# Patient Record
Sex: FEMALE | Race: BLACK OR AFRICAN AMERICAN | NOT HISPANIC OR LATINO | Employment: FULL TIME | URBAN - METROPOLITAN AREA
[De-identification: names, ages, dates, MRNs, and addresses within clinical notes are randomized per-mention and may not be internally consistent; named-entity substitution may affect disease eponyms.]

---

## 2017-12-08 DIAGNOSIS — Z12.11 COLON CANCER SCREENING: ICD-10-CM

## 2018-04-11 ENCOUNTER — OFFICE VISIT (OUTPATIENT)
Dept: FAMILY MEDICINE | Facility: CLINIC | Age: 53
End: 2018-04-11
Payer: OTHER GOVERNMENT

## 2018-04-11 VITALS
HEART RATE: 64 BPM | HEIGHT: 68 IN | SYSTOLIC BLOOD PRESSURE: 102 MMHG | WEIGHT: 204.69 LBS | DIASTOLIC BLOOD PRESSURE: 70 MMHG | BODY MASS INDEX: 31.02 KG/M2 | TEMPERATURE: 99 F

## 2018-04-11 DIAGNOSIS — M75.81 ROTATOR CUFF TENDONITIS, RIGHT: ICD-10-CM

## 2018-04-11 DIAGNOSIS — Z12.11 COLON CANCER SCREENING: ICD-10-CM

## 2018-04-11 DIAGNOSIS — Z12.39 BREAST CANCER SCREENING: ICD-10-CM

## 2018-04-11 DIAGNOSIS — M77.9 TENDINITIS: ICD-10-CM

## 2018-04-11 DIAGNOSIS — Z00.00 PREVENTATIVE HEALTH CARE: Primary | ICD-10-CM

## 2018-04-11 PROCEDURE — 99999 PR PBB SHADOW E&M-EST. PATIENT-LVL III: CPT | Mod: PBBFAC,,, | Performed by: FAMILY MEDICINE

## 2018-04-11 PROCEDURE — 99213 OFFICE O/P EST LOW 20 MIN: CPT | Mod: PBBFAC,PN | Performed by: FAMILY MEDICINE

## 2018-04-11 PROCEDURE — 99396 PREV VISIT EST AGE 40-64: CPT | Mod: S$PBB,,, | Performed by: FAMILY MEDICINE

## 2018-04-11 RX ORDER — DICLOFENAC SODIUM 10 MG/G
2 GEL TOPICAL 2 TIMES DAILY
Qty: 300 G | Refills: 3 | Status: SHIPPED | OUTPATIENT
Start: 2018-04-11 | End: 2019-03-28

## 2018-04-11 NOTE — PROGRESS NOTES
Subjective:     THIS DOCUMENT WAS MADE IN PART WITH [employer] DICTATION SOFTWARE.  OCCASIONALLY THIS SOFTWARE WILL MISINTERPRET WORDS OR PHRASES.     Patient ID: Annamarie Musa is a 52 y.o. female.    Chief Complaint: Annual Exam    HPI   She returns for her annual examination.  She has generally been doing well but does report hot flashes for one year.  She has had a partial hysterectomy so has not had periods for many years.  Hot flashes are uncomfortable.  She has tried over-the-counter treatments that include ginger root and says this has helped mildly.    She does have chronic joint and soft tissue pains.  She does request a refill on the Voltaren gel which has helped her.    She requests breast cancer screening and is agreeable to colon cancer screening    Active Ambulatory Problems     Diagnosis Date Noted    No Active Ambulatory Problems     Resolved Ambulatory Problems     Diagnosis Date Noted    No Resolved Ambulatory Problems     No Additional Past Medical History     Current Outpatient Prescriptions on File Prior to Visit   Medication Sig Dispense Refill    acetaminophen (TYLENOL) suppository Place 325 mg rectally every 4 (four) hours as needed for Temperature greater than.      ibuprofen (ADVIL,MOTRIN) 200 MG tablet Take 200 mg by mouth every 6 (six) hours as needed.      triamcinolone acetonide 0.1% (KENALOG) 0.1 % cream aaa bid x 2 weeks then prn, avoid chronic use. 80 g 1    [DISCONTINUED] diclofenac sodium (VOLTAREN) 1 % Gel Apply 2 g topically 2 (two) times daily. 300 g 3     No current facility-administered medications on file prior to visit.      Review of patient's allergies indicates:  No Known Allergies  Social History   Substance Use Topics    Smoking status: Never Smoker    Smokeless tobacco: Not on file    Alcohol use No     Family History   Problem Relation Age of Onset    Rheum arthritis Mother     Glaucoma Mother     Hypertension Maternal Grandmother          Review of Systems    Constitutional: Negative for appetite change, fatigue, fever and unexpected weight change.   HENT: Negative for sinus pressure and trouble swallowing.    Eyes: Negative for pain and visual disturbance.   Respiratory: Negative.  Negative for cough, chest tightness and shortness of breath.    Cardiovascular: Negative.  Negative for chest pain, palpitations and leg swelling.   Gastrointestinal: Negative.  Negative for abdominal pain, blood in stool, constipation, diarrhea, nausea and vomiting.   Endocrine: Negative for polydipsia and polyuria.   Genitourinary: Negative.  Negative for dysuria, frequency and hematuria.   Musculoskeletal: Positive for arthralgias (knees). Negative for gait problem, myalgias and neck pain.   Skin: Positive for color change. Negative for rash and wound.   Neurological: Negative.  Negative for dizziness, tremors, syncope, numbness and headaches.   Hematological: Negative.    Psychiatric/Behavioral: Negative.  Negative for dysphoric mood and sleep disturbance. The patient is not nervous/anxious.        Objective:      Physical Exam   Constitutional: She is oriented to person, place, and time. She appears well-developed and well-nourished.  Non-toxic appearance. No distress.   HENT:   Head: Normocephalic and atraumatic.   Right Ear: Tympanic membrane, external ear and ear canal normal.   Left Ear: Tympanic membrane, external ear and ear canal normal.   Nose: Nose normal.   Mouth/Throat: Oropharynx is clear and moist. No oropharyngeal exudate.   Eyes: Conjunctivae and EOM are normal. Pupils are equal, round, and reactive to light. No scleral icterus.   Neck: Normal range of motion. Neck supple. No JVD present. No thyromegaly present.   Cardiovascular: Normal rate, regular rhythm, normal heart sounds and intact distal pulses.  PMI is not displaced.  Exam reveals no gallop and no friction rub.    No murmur heard.  Pulmonary/Chest: Breath sounds normal. No respiratory distress. She has no  wheezes. She has no rales.   Abdominal: Soft. Bowel sounds are normal. She exhibits no distension and no mass. There is no tenderness. There is no rebound and no guarding.   Musculoskeletal: She exhibits no edema.   Lymphadenopathy:     She has no cervical adenopathy.   Neurological: She is alert and oriented to person, place, and time. She displays normal reflexes. No cranial nerve deficit. She exhibits normal muscle tone.   Skin: Skin is dry. No rash noted. No pallor.   Psychiatric: She has a normal mood and affect. Her behavior is normal.   Hypopigmented macules and patches.  One on the left cheek, larger area near the right clavicle and neck.  She has seen dermatology and has been diagnosed with vitiligo.   Vitals reviewed.      Assessment:       1. Preventative health care    2. Tendinitis    3. Rotator cuff tendonitis, right    4. Breast cancer screening    5. Colon cancer screening        Plan:       Annamarie was seen today for annual exam.    Diagnoses and all orders for this visit:    Preventative health care  -     Comprehensive metabolic panel; Future  -     TSH; Future  -     Lipid panel; Future  -     CBC auto differential; Future  -     Mammo Digital Screening Bilateral With CAD; Future  -     Case request GI: COLONOSCOPY    Tendinitis  -     diclofenac sodium (VOLTAREN) 1 % Gel; Apply 2 g topically 2 (two) times daily.    Rotator cuff tendonitis, right  -     diclofenac sodium (VOLTAREN) 1 % Gel; Apply 2 g topically 2 (two) times daily.    Breast cancer screening  -     Mammo Digital Screening Bilateral With CAD; Future    Colon cancer screening  -     Case request GI: COLONOSCOPY    We did discuss her hot flashes.  Discussed the likely cause at 52 years old which is menopause.  Discussed treatment options as well as the risk and benefit.  At this time she does not wish to consider hormone replacement most certainly.  She also does not wish to consider treatment with an SNRI.  She will try  over-the-counter supplements but if things worsen or do not improve she will let me know and discuss other options.

## 2018-05-12 ENCOUNTER — HOSPITAL ENCOUNTER (OUTPATIENT)
Dept: RADIOLOGY | Facility: HOSPITAL | Age: 53
Discharge: HOME | End: 2018-05-12
Attending: FAMILY MEDICINE
Payer: OTHER GOVERNMENT

## 2018-05-12 DIAGNOSIS — Z12.39 BREAST CANCER SCREENING: ICD-10-CM

## 2018-05-12 DIAGNOSIS — Z00.00 PREVENTATIVE HEALTH CARE: ICD-10-CM

## 2018-05-12 PROCEDURE — 77067 SCR MAMMO BI INCL CAD: CPT | Mod: 26,,, | Performed by: RADIOLOGY

## 2018-05-12 PROCEDURE — 77067 SCR MAMMO BI INCL CAD: CPT | Mod: TC,PO

## 2018-05-13 ENCOUNTER — PATIENT MESSAGE (OUTPATIENT)
Dept: FAMILY MEDICINE | Facility: CLINIC | Age: 53
End: 2018-05-13

## 2018-05-13 ENCOUNTER — TELEPHONE (OUTPATIENT)
Dept: FAMILY MEDICINE | Facility: CLINIC | Age: 53
End: 2018-05-13

## 2018-05-13 DIAGNOSIS — E83.52 HYPERCALCEMIA: ICD-10-CM

## 2018-05-13 DIAGNOSIS — R79.89 LOW TSH LEVEL: Primary | ICD-10-CM

## 2018-05-13 NOTE — TELEPHONE ENCOUNTER
Please call with laboratory results. I did send an explanation through my chart.     Labs suggest hyperthyroidism. Please schedule a repeat thyroid labs. Also calcium mildly elevated, repeat calcium and a parathyroid hormone.

## 2018-05-14 NOTE — TELEPHONE ENCOUNTER
Spoke with pt and informed her of results; pt said she will go and get labs done this week.     Pt verbally understands.

## 2018-05-15 ENCOUNTER — LAB VISIT (OUTPATIENT)
Dept: LAB | Facility: HOSPITAL | Age: 53
End: 2018-05-15
Attending: FAMILY MEDICINE
Payer: OTHER GOVERNMENT

## 2018-05-15 ENCOUNTER — TELEPHONE (OUTPATIENT)
Dept: RADIOLOGY | Facility: HOSPITAL | Age: 53
End: 2018-05-15

## 2018-05-15 DIAGNOSIS — R79.89 LOW TSH LEVEL: ICD-10-CM

## 2018-05-15 DIAGNOSIS — E83.52 HYPERCALCEMIA: ICD-10-CM

## 2018-05-15 LAB
CA-I BLDV-SCNC: 1.33 MMOL/L
PTH-INTACT SERPL-MCNC: 26 PG/ML
T3FREE SERPL-MCNC: 4.8 PG/ML
T4 FREE SERPL-MCNC: 1.49 NG/DL
TSH SERPL DL<=0.005 MIU/L-ACNC: <0.01 UIU/ML

## 2018-05-15 PROCEDURE — 82330 ASSAY OF CALCIUM: CPT

## 2018-05-15 PROCEDURE — 83970 ASSAY OF PARATHORMONE: CPT

## 2018-05-15 PROCEDURE — 84443 ASSAY THYROID STIM HORMONE: CPT

## 2018-05-15 PROCEDURE — 84439 ASSAY OF FREE THYROXINE: CPT

## 2018-05-15 PROCEDURE — 83520 IMMUNOASSAY QUANT NOS NONAB: CPT

## 2018-05-15 PROCEDURE — 36415 COLL VENOUS BLD VENIPUNCTURE: CPT | Mod: PO

## 2018-05-15 PROCEDURE — 84481 FREE ASSAY (FT-3): CPT

## 2018-05-16 LAB — TSH RECEP AB SER-ACNC: 3.24 IU/L

## 2018-05-17 ENCOUNTER — TELEPHONE (OUTPATIENT)
Dept: FAMILY MEDICINE | Facility: CLINIC | Age: 53
End: 2018-05-17

## 2018-05-17 DIAGNOSIS — E05.90 HYPERTHYROIDISM: Primary | ICD-10-CM

## 2018-05-17 NOTE — TELEPHONE ENCOUNTER
I spoke with patient and discussed the results of her test.  Findings are most likely consistent with Graves disease.    She is not having obvious symptoms at this time.    Please help her arrange consultation with Endocrinology as soon as possible.  It is not emergent as she is not having significant symptoms but I still would prefer to have this evaluated within 1-2 weeks.  If possible we may have to arrange on the [place].  For

## 2018-05-17 NOTE — TELEPHONE ENCOUNTER
Forwarding note to endocrine nurse for scheduling assistance for possible Graves Disease. Per Dr. Ardon patient needs to be seen ASAP

## 2018-05-21 ENCOUNTER — HOSPITAL ENCOUNTER (OUTPATIENT)
Dept: RADIOLOGY | Facility: HOSPITAL | Age: 53
Discharge: HOME | End: 2018-05-21
Attending: FAMILY MEDICINE
Payer: OTHER GOVERNMENT

## 2018-05-21 DIAGNOSIS — R92.8 ABNORMAL MAMMOGRAM OF LEFT BREAST: ICD-10-CM

## 2018-05-21 PROCEDURE — 77061 BREAST TOMOSYNTHESIS UNI: CPT | Mod: TC,PO,LT

## 2018-05-21 PROCEDURE — 77065 DX MAMMO INCL CAD UNI: CPT | Mod: TC,PO,LT

## 2018-05-21 PROCEDURE — 77061 BREAST TOMOSYNTHESIS UNI: CPT | Mod: 26,LT,, | Performed by: RADIOLOGY

## 2018-05-21 PROCEDURE — 77065 DX MAMMO INCL CAD UNI: CPT | Mod: 26,LT,, | Performed by: RADIOLOGY

## 2018-05-21 PROCEDURE — 76642 ULTRASOUND BREAST LIMITED: CPT | Mod: 26,LT,, | Performed by: RADIOLOGY

## 2018-05-21 PROCEDURE — 76642 ULTRASOUND BREAST LIMITED: CPT | Mod: TC,PO,LT

## 2018-05-23 ENCOUNTER — TELEPHONE (OUTPATIENT)
Dept: FAMILY MEDICINE | Facility: CLINIC | Age: 53
End: 2018-05-23

## 2018-05-23 NOTE — TELEPHONE ENCOUNTER
Spoke with pt's daughter, she stated pt was not home yet but would be arriving shortly.  Advised pt's daughter that I was calling to update her on endocrine appt.  Advised that endocrine is pretty booked up, however we are reaching out to outside endocrinologists to see about getting an appt soon.  Advised daughter that if we do not have any progress with getting an appt we will set her up to see Dr. Ardon.  Daughter verbalized understanding and stated she would pass message along to pt and have pt call us back if she has any questions or concerns.

## 2018-05-24 NOTE — TELEPHONE ENCOUNTER
LM with pt to return our call.  Spoke with [facility] and they stated they could get her in with endo in the next 2 weeks.  Pt needs to call [phone] to set up appt (will need insurance info).

## 2018-05-25 NOTE — TELEPHONE ENCOUNTER
LM with pt to return our call.  Spoke with [facility] and they stated they could get her in with endo in the next 2 weeks.  Pt needs to call [phone] to set up appt (will need insurance info).

## 2018-06-01 ENCOUNTER — TELEPHONE (OUTPATIENT)
Dept: FAMILY MEDICINE | Facility: CLINIC | Age: 53
End: 2018-06-01

## 2018-06-01 NOTE — TELEPHONE ENCOUNTER
----- Message from Vanessa Heard sent at 6/1/2018 10:31 AM CDT -----  Contact: Katerin with [facility]   Katerin with [facility] need to speak with nurse regarding mutual patient. Please call back at [phone].

## 2018-06-01 NOTE — TELEPHONE ENCOUNTER
----- Message from Kaley Sheridan sent at 6/1/2018  2:14 PM CDT -----  Contact: [facility]   Type:  Patient Returning Call    Who Called:  Katerin   Who Left Message for Patient:  Malissa Rossl   Does the patient know what this is regarding?:  [facility] returning the nurse phone call. Best Call Back Number:  [phone].    Additional Information:

## 2018-07-10 ENCOUNTER — LAB VISIT (OUTPATIENT)
Dept: LAB | Facility: HOSPITAL | Age: 53
End: 2018-07-10
Attending: INTERNAL MEDICINE
Payer: OTHER GOVERNMENT

## 2018-07-10 DIAGNOSIS — E05.00 GRAVES DISEASE: ICD-10-CM

## 2018-07-10 DIAGNOSIS — E05.90 HYPERTHYROIDISM: Primary | ICD-10-CM

## 2018-07-10 LAB
T3FREE SERPL-MCNC: 2.7 PG/ML
T4 FREE SERPL-MCNC: 0.71 NG/DL

## 2018-07-10 PROCEDURE — 84439 ASSAY OF FREE THYROXINE: CPT

## 2018-07-10 PROCEDURE — 36415 COLL VENOUS BLD VENIPUNCTURE: CPT | Mod: PO

## 2018-07-10 PROCEDURE — 84481 FREE ASSAY (FT-3): CPT

## 2018-07-10 RX ORDER — METHIMAZOLE 10 MG/1
10 TABLET ORAL DAILY
COMMUNITY
End: 2019-03-28

## 2018-07-10 RX ORDER — SODIUM CHLORIDE 0.9 % (FLUSH) 0.9 %
3 SYRINGE (ML) INJECTION
Status: CANCELLED | OUTPATIENT
Start: 2018-07-10

## 2018-07-11 ENCOUNTER — ANESTHESIA (OUTPATIENT)
Dept: ENDOSCOPY | Facility: HOSPITAL | Age: 53
End: 2018-07-11
Payer: OTHER GOVERNMENT

## 2018-07-11 ENCOUNTER — HOSPITAL ENCOUNTER (OUTPATIENT)
Facility: HOSPITAL | Age: 53
Discharge: HOME | End: 2018-07-11
Attending: INTERNAL MEDICINE | Admitting: INTERNAL MEDICINE
Payer: OTHER GOVERNMENT

## 2018-07-11 ENCOUNTER — SURGERY (OUTPATIENT)
Age: 53
End: 2018-07-11

## 2018-07-11 ENCOUNTER — ANESTHESIA EVENT (OUTPATIENT)
Dept: ENDOSCOPY | Facility: HOSPITAL | Age: 53
End: 2018-07-11
Payer: OTHER GOVERNMENT

## 2018-07-11 VITALS
DIASTOLIC BLOOD PRESSURE: 72 MMHG | SYSTOLIC BLOOD PRESSURE: 111 MMHG | HEART RATE: 60 BPM | WEIGHT: 190 LBS | BODY MASS INDEX: 28.79 KG/M2 | TEMPERATURE: 98 F | RESPIRATION RATE: 18 BRPM | OXYGEN SATURATION: 100 % | HEIGHT: 68 IN

## 2018-07-11 DIAGNOSIS — Z12.11 COLON CANCER SCREENING: ICD-10-CM

## 2018-07-11 PROCEDURE — 45380 COLONOSCOPY AND BIOPSY: CPT | Mod: PO | Performed by: INTERNAL MEDICINE

## 2018-07-11 PROCEDURE — 25000003 PHARM REV CODE 250: Mod: PO | Performed by: INTERNAL MEDICINE

## 2018-07-11 PROCEDURE — 37000008 HC ANESTHESIA 1ST 15 MINUTES: Mod: PO | Performed by: INTERNAL MEDICINE

## 2018-07-11 PROCEDURE — 37000009 HC ANESTHESIA EA ADD 15 MINS: Mod: PO | Performed by: INTERNAL MEDICINE

## 2018-07-11 PROCEDURE — D9220A PRA ANESTHESIA: Mod: 33,ANES,, | Performed by: ANESTHESIOLOGY

## 2018-07-11 PROCEDURE — 00812 ANES LWR INTST SCR COLSC: CPT | Mod: PO | Performed by: INTERNAL MEDICINE

## 2018-07-11 PROCEDURE — 63600175 PHARM REV CODE 636 W HCPCS: Mod: PO | Performed by: NURSE ANESTHETIST, CERTIFIED REGISTERED

## 2018-07-11 PROCEDURE — 88305 TISSUE EXAM BY PATHOLOGIST: CPT | Performed by: PATHOLOGY

## 2018-07-11 PROCEDURE — D9220A PRA ANESTHESIA: Mod: 33,CRNA,, | Performed by: NURSE ANESTHETIST, CERTIFIED REGISTERED

## 2018-07-11 PROCEDURE — 45380 COLONOSCOPY AND BIOPSY: CPT | Mod: PT,,, | Performed by: INTERNAL MEDICINE

## 2018-07-11 PROCEDURE — 25000003 PHARM REV CODE 250: Mod: PO | Performed by: ANESTHESIOLOGY

## 2018-07-11 PROCEDURE — 27201012 HC FORCEPS, HOT/COLD, DISP: Mod: PO | Performed by: INTERNAL MEDICINE

## 2018-07-11 PROCEDURE — 88305 TISSUE EXAM BY PATHOLOGIST: CPT | Mod: 26,,, | Performed by: PATHOLOGY

## 2018-07-11 RX ORDER — PROPOFOL 10 MG/ML
VIAL (ML) INTRAVENOUS
Status: DISCONTINUED | OUTPATIENT
Start: 2018-07-11 | End: 2018-07-11

## 2018-07-11 RX ORDER — PROPOFOL 10 MG/ML
VIAL (ML) INTRAVENOUS CONTINUOUS PRN
Status: DISCONTINUED | OUTPATIENT
Start: 2018-07-11 | End: 2018-07-11

## 2018-07-11 RX ORDER — SODIUM CHLORIDE, SODIUM LACTATE, POTASSIUM CHLORIDE, CALCIUM CHLORIDE 600; 310; 30; 20 MG/100ML; MG/100ML; MG/100ML; MG/100ML
INJECTION, SOLUTION INTRAVENOUS CONTINUOUS
Status: DISCONTINUED | OUTPATIENT
Start: 2018-07-11 | End: 2018-07-11 | Stop reason: HOSPADM

## 2018-07-11 RX ORDER — LIDOCAINE HYDROCHLORIDE 10 MG/ML
1 INJECTION, SOLUTION EPIDURAL; INFILTRATION; INTRACAUDAL; PERINEURAL ONCE
Status: COMPLETED | OUTPATIENT
Start: 2018-07-11 | End: 2018-07-11

## 2018-07-11 RX ORDER — LIDOCAINE HCL/PF 100 MG/5ML
SYRINGE (ML) INTRAVENOUS
Status: DISCONTINUED | OUTPATIENT
Start: 2018-07-11 | End: 2018-07-11

## 2018-07-11 RX ADMIN — SODIUM CHLORIDE, SODIUM LACTATE, POTASSIUM CHLORIDE, AND CALCIUM CHLORIDE: .6; .31; .03; .02 INJECTION, SOLUTION INTRAVENOUS at 10:07

## 2018-07-11 RX ADMIN — PROPOFOL 30 MG: 10 INJECTION, EMULSION INTRAVENOUS at 11:07

## 2018-07-11 RX ADMIN — PROPOFOL 75 MCG/KG/MIN: 10 INJECTION, EMULSION INTRAVENOUS at 11:07

## 2018-07-11 RX ADMIN — PROPOFOL 70 MG: 10 INJECTION, EMULSION INTRAVENOUS at 11:07

## 2018-07-11 RX ADMIN — LIDOCAINE HYDROCHLORIDE: 10 INJECTION, SOLUTION EPIDURAL; INFILTRATION; INTRACAUDAL; PERINEURAL at 10:07

## 2018-07-11 RX ADMIN — LIDOCAINE HYDROCHLORIDE 100 MG: 20 INJECTION, SOLUTION INTRAVENOUS at 11:07

## 2018-07-11 RX ADMIN — PROPOFOL 50 MG: 10 INJECTION, EMULSION INTRAVENOUS at 11:07

## 2018-07-11 NOTE — [employer] PATIENT INSTRUCTIONS
Discharge Summary/Instructions for after Colonoscopy with   Biopsy/Polypectomy  Annamarie Musa    Wednesday, July 11, 2018  Devante Neely MD  RESTRICTIONS ON ACTIVITY:  - Do not drive a car or operate machinery until the day after the procedure.      - The following day: return to full activity including work.  - For  3 days: No heavy lifting, straining or running.  - Diet: You can have solid foods, but no gassy foods (i.e. beans, broccoli,   cabbage, etc).  TREATMENT FOR COMMON SIDE EFFECTS:  - Mild abdominal pain and bloating or excessive gas: rest, eat lightly and   use a heating pad.  SYMPTOMS TO WATCH FOR AND REPORT TO YOUR PHYSICIAN:  1. Severe abdominal pain.  2. Fever within 24 hours after a procedure.  3. A large amount of rectal bleeding. (A small amount of blood from the   rectum is not serious, especially if hemorrhoids are present.  3.  Because air was put into your colon during the procedure, expelling   large amounts of air from your rectum is normal.  4.  You may not have a bowel movement for 1-3 days because of the   colonoscopy prep.  This is normal.  5.  Call immediately if you notice any of the following:   Chills and/or fever over 101   Persistent vomiting   Severe abdominal pain, other than gas cramps   Severe chest pain   Black, tarry stools   Any bleeding - exceeding one tablespoon  Your doctor recommends these additional instructions:  We are waiting for your pathology results.   If the pathology report reveals adenomatous (precancerous) tissue, then your   physician recommends a repeat colonoscopy for surveillance in 5 years.   If the pathology report indicates a hyperplastic polyp, then the colonoscopy   will be repeated for surveillance in 8 years.   Eat a high fiber diet.   Take a fiber supplement, for example Citrucel, Fibercon, Konsyl or   Metamucil.   Take a PROBIOTIC, such as a carton of GREEK YOGURT (Chobani or Oikos,  or   Activia or Dannon);  or tablets of ALIGN or  CULTURELLE or EDWARD-Q (all   non-prescription), every day for a month.   And repeat this 3-4 times a   year.  None  If you have any questions or problems, please call your physician.  EMERGENCY PHONE NUMBER: [phone]  LAB RESULTS: Call in two (2) weeks for lab results, [phone]  ___________________________________________  Nurse Signature  ___________________________________________  Patient/Designated Responsible Party Signature  Devante Neely MD  7/11/2018 11:59:52 AM  This report has been verified and signed electronically.  [employer]

## 2018-07-11 NOTE — H&P
History & Physical - Short Stay  Gastroenterology      SUBJECTIVE:     Procedure: Colonoscopy    Chief Complaint/Indication for Procedure: Screening    History of Present Illness:  Asymptomatic  Office Visit     4/11/2018  [facility]      Deny Ardon MD   Family Medicine   Preventative health care +4 more   Dx   Annual Exam   Reason for Visit    Progress Notes        Subjective:     THIS DOCUMENT WAS MADE IN PART WITH [employer] DICTATION SOFTWARE.  OCCASIONALLY THIS SOFTWARE WILL MISINTERPRET WORDS OR PHRASES.      Patient ID: Annamarie Musa is a 52 y.o. female.     Chief Complaint: Annual Exam     HPI   She returns for her annual examination.  She has generally been doing well but does report hot flashes for one year.  She has had a partial hysterectomy so has not had periods for many years.  Hot flashes are uncomfortable.  She has tried over-the-counter treatments that include adan root and says this has helped mildly.     She does have chronic joint and soft tissue pains.  She does request a refill on the Voltaren gel which has helped her.     She requests breast cancer screening and is agreeable to colon cancer screening     Assessment:       1. Preventative health care    2. Tendinitis    3. Rotator cuff tendonitis, right    4. Breast cancer screening    5. Colon cancer screening        Plan:       Annamarie was seen today for annual exam.     Diagnoses and all orders for this visit:     Preventative health care  -     Comprehensive metabolic panel; Future  -     TSH; Future  -     Lipid panel; Future  -     CBC auto differential; Future  -     Mammo Digital Screening Bilateral With CAD; Future  -     Case request GI: COLONOSCOPY     Tendinitis  -     diclofenac sodium (VOLTAREN) 1 % Gel; Apply 2 g topically 2 (two) times daily.     Rotator cuff tendonitis, right  -     diclofenac sodium (VOLTAREN) 1 % Gel; Apply 2 g topically 2 (two) times daily.     Breast cancer  "screening  -     Mammo Digital Screening Bilateral With CAD; Future     Colon cancer screening  -     Case request GI: COLONOSCOPY     We did discuss her hot flashes.  Discussed the likely cause at 52 years old which is menopause.  Discussed treatment options as well as the risk and benefit.  At this time she does not wish to consider hormone replacement most certainly.  She also does not wish to consider treatment with an SNRI.  She will try over-the-counter supplements but if things worsen or do not improve she will let me know and discuss other options.                 PTA Medications   Medication Sig    ibuprofen (ADVIL,MOTRIN) 200 MG tablet Take 200 mg by mouth every 6 (six) hours as needed.    methIMAzole (TAPAZOLE) 10 MG Tab Take 10 mg by mouth once daily.    diclofenac sodium (VOLTAREN) 1 % Gel Apply 2 g topically 2 (two) times daily.       Review of patient's allergies indicates:  No Known Allergies     Past Medical History:   Diagnosis Date    Thyroid disease     hyperthyroidism     Past Surgical History:   Procedure Laterality Date    HYSTERECTOMY      ovaries conserved    TUBAL LIGATION      WISDOM TOOTH EXTRACTION       Family History   Problem Relation Age of Onset    Rheum arthritis Mother     Glaucoma Mother     Hypertension Maternal Grandmother      Social History   Substance Use Topics    Smoking status: Never Smoker    Smokeless tobacco: Never Used    Alcohol use No         OBJECTIVE:     Vital Signs (Most Recent)  Temp: 97.5 °F (36.4 °C) (07/11/18 1040)  Pulse: (!) 56 (07/11/18 1040)  Resp: 18 (07/11/18 1040)  BP: 108/70 (07/11/18 1040)  SpO2: 99 % (07/11/18 1040)    Physical Exam:          : Ht 5' 8" (1.727 m)   Wt 92.8 kg (204 lb 11.2 oz)   BMI 31.12 kg/m²                                           GENERAL:  Comfortable, in no acute distress.                                 HEENT EXAM:  Nonicteric.  No adenopathy.  Oropharynx is clear.               NECK:  Supple.                    "                                            LUNGS:  Clear.                                                               CARDIAC:  Regular rate and rhythm.  S1, S2.  No murmur.                      ABDOMEN:  Soft, positive bowel sounds, nontender.  No hepatosplenomegaly or masses.  No rebound or guarding.                                             EXTREMITIES:  No edema.     MENTAL STATUS:  Alert and oriented.    ASSESSMENT/PLAN:     Assessment: Colorectal cancer screening    Plan: Colonoscopy    Anesthesia Plan:   MAC / General Anaesthesia    ASA Grade: ASA 2 - Patient with mild systemic disease with no functional limitations    MALLAMPATI SCORE: II (hard and soft palate, upper portion of tonsils anduvula visible)

## 2018-07-11 NOTE — ANESTHESIA PREPROCEDURE EVALUATION
07/11/2018  Annamarie Musa is a 53 y.o., female.    Anesthesia Evaluation    I have reviewed the Patient Summary Reports.    I have reviewed the Nursing Notes.   I have reviewed the Medications.     Review of Systems  Anesthesia Hx:  No problems with previous Anesthesia    Social:  Non-Smoker    Hematology/Oncology:  Hematology Normal   Oncology Normal     EENT/Dental:EENT/Dental Normal   Cardiovascular:  Cardiovascular Normal     Pulmonary:  Pulmonary Normal    Renal/:  Renal/ Normal     Hepatic/GI:   Bowel Prep.    Musculoskeletal:  Musculoskeletal Normal    Neurological:  Neurology Normal    Endocrine:   Hyperthyroidism    Dermatological:  Skin Normal    Psych:  Psychiatric Normal           Physical Exam  General:  Well nourished    Airway/Jaw/Neck:  Airway Findings: Mouth Opening: Normal Tongue: Normal  General Airway Assessment: Adult  Mallampati: I  TM Distance: Normal, at least 6 cm        Eyes/Ears/Nose:  EYES/EARS/NOSE FINDINGS: Normal   Dental:  DENTAL FINDINGS: Normal   Chest/Lungs:  Chest/Lungs Findings: Clear to auscultation, Normal Respiratory Rate     Heart/Vascular:  Heart Findings: Rate: Normal  Rhythm: Regular Rhythm        Mental Status:  Mental Status Findings:  Cooperative, Alert and Oriented         Anesthesia Plan  Type of Anesthesia, risks & benefits discussed:  Anesthesia Type:  general  Patient's Preference:   Intra-op Monitoring Plan: standard ASA monitors  Intra-op Monitoring Plan Comments:   Post Op Pain Control Plan: IV/PO Opioids PRN  Post Op Pain Control Plan Comments:   Induction:    Beta Blocker:  Patient is not currently on a Beta-Blocker (No further documentation required).       Informed Consent: Patient understands risks and agrees with Anesthesia plan.  Questions answered. Anesthesia consent signed with patient.  ASA Score: 2     Day of Surgery Review of History &  Physical: I have interviewed and examined the patient. I have reviewed the patient's H&P dated:  There are no significant changes.  H&P update referred to the provider.         Ready For Surgery From Anesthesia Perspective.

## 2018-07-11 NOTE — DISCHARGE INSTRUCTIONS
Recovery After Procedural Sedation (Adult)  You have been given medicine by vein to make you sleep during your surgery. This may have included both a pain medicine and sleeping medicine. Most of the effects have worn off. But you may still have some drowsiness for the next 6 to 8 hours.  Home care  Follow these guidelines when you get home:  · For the next 8 hours, you should be watched by a responsible adult. This person should make sure your condition is not getting worse.  · Don't drink any alcohol for the next 24 hours.  · Don't drive, operate dangerous machinery, or make important business or personal decisions during the next 24 hours.  Note: Your healthcare provider may tell you not to take any medicine by mouth for pain or sleep in the next 4 hours. These medicines may react with the medicines you were given in the hospital. This could cause a much stronger response than usual.  Follow-up care  Follow up with your healthcare provider if you are not alert and back to your usual level of activity within 12 hours.  When to seek medical advice  Call your healthcare provider right away if any of these occur:  · Drowsiness gets worse  · Weakness or dizziness gets worse  · Repeated vomiting  · You can't be awakened   Date Last Reviewed: 10/18/2016  © 4597-9724 [employer]. [address] [place] [address], [place] [address]. All rights reserved. This information is not intended as a substitute for professional medical care. Always follow your healthcare professional's instructions.      PROBIOTICS:  Now that your colon is so cleaned out, now is a good time for a round of PROBIOTICS.  Eat a container of Greek Yogurt, such as OIKOS or CHOBANI,  Or Activia or Dannon    Greek Yogurt.    Or Take a similar Probiotic product such as Align or Culturelle or Kari-Q, every day for a month.                  (The products listed are non-prescription, but you may need to ask the pharmacist for their location.)  Repeat  this 3-4 times a year.        High-Fiber Diet  Fiber is in fruits, vegetables, cereals, and grains. Fiber passes through your body undigested. A high-fiber diet helps food move through your intestinal tract. The added bulk is helpful in preventing constipation. In people with diverticulosis, fiber helps clean out the pouches along the colon wall. It also prevents new pouches from forming. A high-fiber diet reduces the risk of colon cancer. It also lowers blood cholesterol and prevents high blood sugar in people with diabetes.    The fiber-rich foods listed below should be part of your diet. If you are not used to high-fiber foods, start with 1 or 2 foods from this list. Every 3 to 4 days add a new one to your diet. Do this until you are eating 4 high-fiber foods per day. This should give you 20 to 35 grams of fiber a day. It is also important to drink a lot of water when you are on this diet. You should have 6 to 8 glasses of water a day. Water makes the fiber swell and increases the benefit.  Foods high in dietary fiber  The following foods are high in dietary fiber:  · Breads. Breads made with 100% whole-wheat flour; aye, wheat, or rye crackers; whole-grain tortillas, bran muffins.  · Cereals. Whole-grain and bran cereals with bran (shredded wheat, wheat flakes, raisin bran, corn bran); oatmeal, rolled oats, granola, and brown rice.  · Fruits. Fresh fruits and their edible skins (pears, prunes, raisins, berries, apples, and apricots); bananas, citrus fruit, mangoes, pineapple; and prune juice.  · Nuts. Any nuts and seeds.  · Vegetables. Best served raw or lightly cooked. All types, especially: green peas, celery, eggplant, potatoes, spinach, broccoli, [place] sprouts, winter squash, carrots, cauliflower, soybeans, lentils, and fresh and dried beans of all kinds.  · Other. Popcorn, any spices.  Date Last Reviewed: 8/1/2016  © 4366-2907 [employer]. [address] [place] [address], [place] [address]. All  rights reserved. This information is not intended as a substitute for professional medical care. Always follow your healthcare professional's instructions.

## 2018-07-11 NOTE — BRIEF OP NOTE
Discharge Note  Short Stay      SUMMARY     Admit Date: 7/11/2018    Attending Physician: Devante Neely Jr., MD     Discharge Physician: Devante Neely Jr., MD    Discharge Date: 7/11/2018 12:01 PM    Final Diagnosis: Preventative health care [Z00.00]  Colon cancer screening [Z12.11]  Impression:          - One 1 to 2 mm polyp at the hepatic flexure,                        removed piecemeal using a cold biopsy forceps.                        Resected and retrieved.                       - Non-bleeding internal hemorrhoids.                       - Redundant colon.                       - The examination was otherwise normal.                       - The examined portion of the ileum was normal.                       - The entire examined colon is normal.  Recommendation:      - Discharge patient to home.                       - Await pathology results.                       - If the pathology report reveals adenomatous                        tissue, then repeat the colonoscopy for surveillance                        in 5 years.                       - If the pathology report indicates hyperplastic                        polyp, then repeat colonoscopy for surveillance in 8                        years.                       - High fiber diet.                       - Use fiber, for example Citrucel, Fibercon, Konsyl                        or Metamucil.                       - Take a PROBIOTIC, such as a carton of GREEK YOGURT                        (Chobani or Oikos, or Activia or Dannon); or tablets                        of ALIGN or CULTURELLE or EDWARD-Q (all                        non-prescription), every day for a month.                       - Continue present medications.                       - Patient has a contact number available for                        emergencies. The signs and symptoms of potential                        delayed complications were discussed with the                        patient.  Return to normal activities tomorrow.                        Written discharge instructions were provided to the                        patient.                       - Return to normal activities tomorrow.  Devante Neely MD  7/11/2018  Disposition: HOME OR SELF CARE    Patient Instructions:   Current Discharge Medication List      CONTINUE these medications which have NOT CHANGED    Details   ibuprofen (ADVIL,MOTRIN) 200 MG tablet Take 200 mg by mouth every 6 (six) hours as needed.      methIMAzole (TAPAZOLE) 10 MG Tab Take 10 mg by mouth once daily.      diclofenac sodium (VOLTAREN) 1 % Gel Apply 2 g topically 2 (two) times daily.  Qty: 300 g, Refills: 3    Associated Diagnoses: Tendinitis; Rotator cuff tendonitis, right             Discharge Procedure Orders (must include Diet, Follow-up, Activity)    Follow Up:  Follow up with PCP as per your routine.  Please follow a high fiber diet.  Activity as tolerated.    No driving day of procedure.    PROBIOTICS:  Now that your colon is so cleaned out, now is a good time for a round of PROBIOTICS.  Eat a container of Greek Yogurt, such as OIKOS or CHOBANI,  Or Activia or Dannon    Greek Yogurt.    Or Take a similar Probiotic product such as Align or Culturelle or Kari-Q, every day for a month.                  (The products listed are non-prescription, but you may need to ask the pharmacist for their location.)  Repeat this 3-4 times a year.

## 2018-07-11 NOTE — TRANSFER OF CARE
"Anesthesia Transfer of Care Note    Patient: Annamarie Musa    Procedure(s) Performed: Procedure(s) (LRB):  COLONOSCOPY (N/A)    Patient location: PACU    Anesthesia Type: general    Transport from OR: Transported from OR on 2-3 L/min O2 by NC with adequate spontaneous ventilation    Post pain: adequate analgesia    Post assessment: no apparent anesthetic complications    Post vital signs: stable    Level of consciousness: alert, oriented and awake    Nausea/Vomiting: no nausea/vomiting    Complications: none    Transfer of care protocol was followed      Last vitals:   Visit Vitals  /70 (BP Location: Right arm, Patient Position: Lying)   Pulse (!) 56   Temp 36.4 °C (97.5 °F) (Skin)   Resp 18   Ht 5' 8" (1.727 m)   Wt 86.2 kg (190 lb)   SpO2 99%   Breastfeeding? No   BMI 28.89 kg/m²     "

## 2018-07-11 NOTE — ANESTHESIA POSTPROCEDURE EVALUATION
"Anesthesia Post Evaluation    Patient: Annamarie Musa    Procedure(s) Performed: Procedure(s) (LRB):  COLONOSCOPY (N/A)    Final Anesthesia Type: general  Patient location during evaluation: PACU  Patient participation: Yes- Able to Participate  Level of consciousness: awake and alert  Post-procedure vital signs: reviewed and stable  Pain management: adequate  Airway patency: patent  PONV status at discharge: No PONV  Anesthetic complications: no      Cardiovascular status: hemodynamically stable  Respiratory status: unassisted and room air  Hydration status: euvolemic  Follow-up not needed.        Visit Vitals  /72 (BP Location: Left arm, Patient Position: Sitting)   Pulse 60   Temp 36.4 °C (97.5 °F)   Resp 18   Ht 5' 8" (1.727 m)   Wt 86.2 kg (190 lb)   SpO2 100%   Breastfeeding? No   BMI 28.89 kg/m²       Pain/Lali Score: Pain Assessment Performed: Yes (7/11/2018 12:14 PM)  Presence of Pain: non-verbal indicators absent (sedated) (7/11/2018 11:50 AM)  Lali Score: 6 (7/11/2018 11:50 AM)      "

## 2018-11-03 ENCOUNTER — LAB VISIT (OUTPATIENT)
Dept: LAB | Facility: HOSPITAL | Age: 53
End: 2018-11-03
Attending: INTERNAL MEDICINE
Payer: OTHER GOVERNMENT

## 2018-11-03 DIAGNOSIS — E05.90 HYPERTHYROIDISM: Primary | ICD-10-CM

## 2018-11-03 LAB
T3FREE SERPL-MCNC: 2.5 PG/ML
T4 FREE SERPL-MCNC: 0.83 NG/DL
TSH SERPL DL<=0.005 MIU/L-ACNC: 9.18 UIU/ML

## 2018-11-03 PROCEDURE — 84443 ASSAY THYROID STIM HORMONE: CPT

## 2018-11-03 PROCEDURE — 84439 ASSAY OF FREE THYROXINE: CPT

## 2018-11-03 PROCEDURE — 84481 FREE ASSAY (FT-3): CPT

## 2018-11-03 PROCEDURE — 36415 COLL VENOUS BLD VENIPUNCTURE: CPT | Mod: PO

## 2018-12-05 ENCOUNTER — LAB VISIT (OUTPATIENT)
Dept: LAB | Facility: HOSPITAL | Age: 53
End: 2018-12-05
Attending: INTERNAL MEDICINE
Payer: OTHER GOVERNMENT

## 2018-12-05 DIAGNOSIS — E05.90 HYPERTHYROIDISM: Primary | ICD-10-CM

## 2018-12-05 LAB
T3FREE SERPL-MCNC: <1 PG/ML
T4 FREE SERPL-MCNC: <0.4 NG/DL
TSH SERPL DL<=0.005 MIU/L-ACNC: 207.43 UIU/ML

## 2018-12-05 PROCEDURE — 36415 COLL VENOUS BLD VENIPUNCTURE: CPT | Mod: PO

## 2018-12-05 PROCEDURE — 84443 ASSAY THYROID STIM HORMONE: CPT

## 2018-12-05 PROCEDURE — 84481 FREE ASSAY (FT-3): CPT

## 2018-12-05 PROCEDURE — 84439 ASSAY OF FREE THYROXINE: CPT

## 2019-01-26 ENCOUNTER — LAB VISIT (OUTPATIENT)
Dept: LAB | Facility: HOSPITAL | Age: 54
End: 2019-01-26
Attending: INTERNAL MEDICINE
Payer: OTHER GOVERNMENT

## 2019-01-26 DIAGNOSIS — E89.0 POSTABLATIVE HYPOTHYROIDISM: Primary | ICD-10-CM

## 2019-01-26 LAB
T4 FREE SERPL-MCNC: 1.89 NG/DL
TSH SERPL DL<=0.005 MIU/L-ACNC: 0.27 UIU/ML

## 2019-01-26 PROCEDURE — 84439 ASSAY OF FREE THYROXINE: CPT

## 2019-01-26 PROCEDURE — 36415 COLL VENOUS BLD VENIPUNCTURE: CPT | Mod: PO

## 2019-01-26 PROCEDURE — 84443 ASSAY THYROID STIM HORMONE: CPT

## 2019-03-11 ENCOUNTER — LAB VISIT (OUTPATIENT)
Dept: LAB | Facility: HOSPITAL | Age: 54
End: 2019-03-11
Attending: INTERNAL MEDICINE
Payer: OTHER GOVERNMENT

## 2019-03-11 DIAGNOSIS — E89.0 POSTSURGICAL HYPOTHYROIDISM: Primary | ICD-10-CM

## 2019-03-11 PROCEDURE — 84439 ASSAY OF FREE THYROXINE: CPT

## 2019-03-11 PROCEDURE — 36415 COLL VENOUS BLD VENIPUNCTURE: CPT | Mod: PO

## 2019-03-11 PROCEDURE — 84443 ASSAY THYROID STIM HORMONE: CPT

## 2019-03-12 LAB
T4 FREE SERPL-MCNC: 1.51 NG/DL
TSH SERPL DL<=0.005 MIU/L-ACNC: 0.02 UIU/ML

## 2019-03-28 ENCOUNTER — OFFICE VISIT (OUTPATIENT)
Dept: FAMILY MEDICINE | Facility: CLINIC | Age: 54
End: 2019-03-28
Payer: OTHER GOVERNMENT

## 2019-03-28 VITALS
BODY MASS INDEX: 30.64 KG/M2 | WEIGHT: 202.19 LBS | TEMPERATURE: 98 F | SYSTOLIC BLOOD PRESSURE: 100 MMHG | DIASTOLIC BLOOD PRESSURE: 68 MMHG | OXYGEN SATURATION: 98 % | HEIGHT: 68 IN | HEART RATE: 76 BPM

## 2019-03-28 DIAGNOSIS — M76.61 ACHILLES BURSITIS OF RIGHT LOWER EXTREMITY: Primary | ICD-10-CM

## 2019-03-28 DIAGNOSIS — M65.9 TENOSYNOVITIS OF FOOT: ICD-10-CM

## 2019-03-28 PROCEDURE — 99999 PR PBB SHADOW E&M-EST. PATIENT-LVL III: ICD-10-PCS | Mod: PBBFAC,,, | Performed by: FAMILY MEDICINE

## 2019-03-28 PROCEDURE — 99214 OFFICE O/P EST MOD 30 MIN: CPT | Mod: S$PBB,,, | Performed by: FAMILY MEDICINE

## 2019-03-28 PROCEDURE — 99214 PR OFFICE/OUTPT VISIT, EST, LEVL IV, 30-39 MIN: ICD-10-PCS | Mod: S$PBB,,, | Performed by: FAMILY MEDICINE

## 2019-03-28 PROCEDURE — 99213 OFFICE O/P EST LOW 20 MIN: CPT | Mod: PBBFAC,PN | Performed by: FAMILY MEDICINE

## 2019-03-28 PROCEDURE — 99999 PR PBB SHADOW E&M-EST. PATIENT-LVL III: CPT | Mod: PBBFAC,,, | Performed by: FAMILY MEDICINE

## 2019-03-28 RX ORDER — LEVOTHYROXINE SODIUM 125 UG/1
TABLET ORAL
COMMUNITY
Start: 2019-03-13 | End: 2019-06-18 | Stop reason: SDUPTHER

## 2019-03-28 RX ORDER — NABUMETONE 750 MG/1
750 TABLET, FILM COATED ORAL 2 TIMES DAILY
Qty: 20 TABLET | Refills: 1 | Status: SHIPPED | OUTPATIENT
Start: 2019-03-28 | End: 2019-06-18

## 2019-03-28 RX ORDER — LEVOTHYROXINE SODIUM 125 UG/1
125 TABLET ORAL
COMMUNITY
Start: 2019-03-13 | End: 2020-07-10

## 2019-03-28 NOTE — PROGRESS NOTES
If the THIS DOCUMENT WAS MADE IN PART WITH VOICE RECOGNITION SOFTWARE.  OCCASIONALLY THIS SOFTWARE WILL MISINTERPRET WORDS OR PHRASES.      Annamarie Musa  1965    Annamarie was seen today for foot pain.    Diagnoses and all orders for this visit:    Achilles bursitis of right lower extremity  -     nabumetone (RELAFEN) 750 MG tablet; Take 1 tablet (750 mg total) by mouth 2 (two) times daily.    Tenosynovitis of foot     She appears to have an acute right Achilles bursitis and probably tendonitis.  She has more recently developed a tennis and vitas along the extensor tendons of the right foot as well.  Possibly compensatory.  Will start with rest ice and oral anti inflammatory.  If not improving consider Podiatry and or physical therapy.      Subjective     Chief Complaint   Patient presents with    Foot Pain     rt foot pain       HPI  Foot pain   -  Location:  Right foot, posterior  -  Quality:  ache  -  Severity: moderate  -  Duration:  2 weeks  -  Timing:  Constant but worse with activity  -  Context:  No injury, negative rheumatological workup and gout workup a number of years ago serologically  -  Modifying factors:  Exacerbated by activity.  Perhaps mild improvement with ice.  -  Associated signs and symptoms:  No fever or chills    She also has a history of Graves disease and informed him that she was treated with radioactive iodine late 2018.  There now just in her Synthroid levels but overall she is doing well it would appear      HPI elements addressed above in the assessment and plan including problems, diagnosis, stability/instability,  improving/worsening, and chronicity will not be duplicated in this section. Any important additional HPI topics will be discussed here if needed.    Active Ambulatory Problems     Diagnosis Date Noted    Colon cancer screening 07/11/2018    Graves disease      Resolved Ambulatory Problems     Diagnosis Date Noted    No Resolved Ambulatory Problems     Past  "Medical History:   Diagnosis Date    Graves disease          Review of Systems   Constitutional: Positive for activity change and fatigue.   Respiratory: Negative.    Cardiovascular: Negative.    Gastrointestinal: Negative.    Musculoskeletal: Negative for arthralgias.       Objective     Physical Exam   Constitutional: She is oriented to person, place, and time. She appears well-developed and well-nourished. No distress.   HENT:   Head: Normocephalic and atraumatic.   Eyes: No scleral icterus.   Pulmonary/Chest: Effort normal. No respiratory distress.   Musculoskeletal:        Feet:    Neurological: She is alert and oriented to person, place, and time.   Ambulatory   Skin: She is not diaphoretic.   Psychiatric: She has a normal mood and affect. Her behavior is normal.   Vitals reviewed.    Vitals:    03/28/19 1100   BP: 100/68   BP Location: Left arm   Patient Position: Sitting   BP Method: Large (Manual)   Pulse: 76   Temp: 98.2 °F (36.8 °C)   TempSrc: Oral   SpO2: 98%   Weight: 91.7 kg (202 lb 2.6 oz)   Height: 5' 8" (1.727 m)           "

## 2019-05-04 ENCOUNTER — LAB VISIT (OUTPATIENT)
Dept: LAB | Facility: HOSPITAL | Age: 54
End: 2019-05-04
Attending: FAMILY MEDICINE
Payer: OTHER GOVERNMENT

## 2019-05-04 DIAGNOSIS — E89.0 POSTSURGICAL HYPOTHYROIDISM: Primary | ICD-10-CM

## 2019-05-04 LAB
T4 FREE SERPL-MCNC: 1.34 NG/DL (ref 0.71–1.51)
TSH SERPL DL<=0.005 MIU/L-ACNC: 0.04 UIU/ML (ref 0.4–4)

## 2019-05-04 PROCEDURE — 84443 ASSAY THYROID STIM HORMONE: CPT

## 2019-05-04 PROCEDURE — 84439 ASSAY OF FREE THYROXINE: CPT

## 2019-05-04 PROCEDURE — 36415 COLL VENOUS BLD VENIPUNCTURE: CPT | Mod: PO

## 2019-06-18 ENCOUNTER — OFFICE VISIT (OUTPATIENT)
Dept: FAMILY MEDICINE | Facility: CLINIC | Age: 54
End: 2019-06-18
Payer: OTHER GOVERNMENT

## 2019-06-18 VITALS
SYSTOLIC BLOOD PRESSURE: 96 MMHG | RESPIRATION RATE: 18 BRPM | BODY MASS INDEX: 31.63 KG/M2 | TEMPERATURE: 98 F | HEART RATE: 64 BPM | WEIGHT: 208.69 LBS | HEIGHT: 68 IN | DIASTOLIC BLOOD PRESSURE: 64 MMHG

## 2019-06-18 DIAGNOSIS — Z12.39 BREAST CANCER SCREENING: ICD-10-CM

## 2019-06-18 DIAGNOSIS — Z00.00 PREVENTATIVE HEALTH CARE: Primary | ICD-10-CM

## 2019-06-18 DIAGNOSIS — Z23 NEED FOR TDAP VACCINATION: ICD-10-CM

## 2019-06-18 PROCEDURE — 90471 IMMUNIZATION ADMIN: CPT | Mod: PBBFAC,PN

## 2019-06-18 PROCEDURE — 99396 PREV VISIT EST AGE 40-64: CPT | Mod: S$PBB,,, | Performed by: FAMILY MEDICINE

## 2019-06-18 PROCEDURE — 99396 PR PREVENTIVE VISIT,EST,40-64: ICD-10-PCS | Mod: S$PBB,,, | Performed by: FAMILY MEDICINE

## 2019-06-18 PROCEDURE — 99214 OFFICE O/P EST MOD 30 MIN: CPT | Mod: PBBFAC,PN | Performed by: FAMILY MEDICINE

## 2019-06-18 PROCEDURE — 99999 PR PBB SHADOW E&M-EST. PATIENT-LVL IV: CPT | Mod: PBBFAC,,, | Performed by: FAMILY MEDICINE

## 2019-06-18 PROCEDURE — 99999 PR PBB SHADOW E&M-EST. PATIENT-LVL IV: ICD-10-PCS | Mod: PBBFAC,,, | Performed by: FAMILY MEDICINE

## 2019-06-18 NOTE — PROGRESS NOTES
THIS DOCUMENT WAS MADE IN PART WITH VOICE RECOGNITION SOFTWARE.  OCCASIONALLY THIS SOFTWARE WILL MISINTERPRET WORDS OR PHRASES.      Annamarie Musa  1965    Annamarie was seen today for annual exam.    Diagnoses and all orders for this visit:    Preventative health care  -     Comprehensive metabolic panel; Future  -     Lipid panel; Future  -     CBC auto differential; Future    Breast cancer screening  -     Mammo Digital Screening Bilateral With CAD; Future   she does report some fullness in the right axillary region. I did not palpate any worrisome findings. I did ask her to monitor for a change. Will proceed with mammogram. There is no definitive mass for me to recommend ultrasound, and I suspect these are just bad changes but will require monitoring or follow-up if a change.    Need for Tdap vaccination  -     (In Office Administered) Tdap Vaccine     Also there's still some evidence of Achilles bursitis. But she's not having any limitations. I did advise her to monitor this. Discussed possibly consulting with orthopedics podiatry but she feels that she is overall doing better now than decline.    Subjective     Chief Complaint   Patient presents with    Annual Exam       HPI     Routine wellness. She is overall doing well. She's exercising well, making healthy dietary choices but frustrated she is not been as successful with weight loss. But reports additional dietary changes planned. She is following with endocrinology regarding her Graves' disease.     Her ankle is better but there is still some swelling over the left Achilles insertion.     And she does report some fullness in the right axillary region. She reports it had an infection persists here that is better but there appears to be some persistent mild fullness compared to the other side. It is not actively increasing in size. She is due for a mammogram and will schedule    Active Ambulatory Problems     Diagnosis Date Noted    Colon cancer  screening 07/11/2018    Graves disease      Resolved Ambulatory Problems     Diagnosis Date Noted    No Resolved Ambulatory Problems     Past Medical History:   Diagnosis Date    Graves disease          Review of Systems   Constitutional: Positive for unexpected weight change.   HENT: Negative.    Eyes: Negative.    Respiratory: Negative.    Cardiovascular: Negative.    Gastrointestinal: Negative.    Genitourinary: Negative.    Musculoskeletal: Negative.    Skin: Negative.    Neurological: Negative.    Psychiatric/Behavioral: Negative.        Objective     Physical Exam   Constitutional: She is oriented to person, place, and time. She appears well-developed and well-nourished.  Non-toxic appearance. No distress.   HENT:   Head: Normocephalic and atraumatic.   Right Ear: Tympanic membrane, external ear and ear canal normal.   Left Ear: Tympanic membrane, external ear and ear canal normal.   Nose: Nose normal.   Mouth/Throat: Oropharynx is clear and moist. No oropharyngeal exudate.   Eyes: Pupils are equal, round, and reactive to light. Conjunctivae and EOM are normal. No scleral icterus.   Neck: Normal range of motion. Neck supple. Thyromegaly ( and mildly tender) present.   Cardiovascular: Normal rate, regular rhythm, normal heart sounds and intact distal pulses. PMI is not displaced. Exam reveals no gallop and no friction rub.   No murmur heard.  Pulmonary/Chest: Effort normal and breath sounds normal. No respiratory distress. She has no wheezes. She has no rales.   I examined both axillary regions. I don't palpate any notes, worrisome masses. She does have some prominent fat pads along the upper lateral pectoralis muscles bilaterally, slightly more prominent on the right. No redness, warmth, and pustules.   Abdominal: Soft. Bowel sounds are normal. She exhibits no distension and no mass. There is no tenderness. There is no guarding.   Musculoskeletal: She exhibits no edema.   Unilateral swelling over the which  "is bursa. But there's no tenderness, and no tenderness or weakness over the Achilles itself.   Lymphadenopathy:     She has no cervical adenopathy.   Neurological: She is alert and oriented to person, place, and time. She displays normal reflexes. No cranial nerve deficit. She exhibits normal muscle tone.   Skin: Skin is dry. No rash noted. No pallor.   Hyperpigmented macules and patches   Psychiatric: She has a normal mood and affect. Her behavior is normal.   Vitals reviewed.    Vitals:    06/18/19 1459   BP: 96/64   BP Location: Right arm   Patient Position: Sitting   BP Method: Large (Manual)   Pulse: 64   Resp: 18   Temp: 98.4 °F (36.9 °C)   TempSrc: Oral   Weight: 94.7 kg (208 lb 10.7 oz)   Height: 5' 8" (1.727 m)       MOST RECENT LABS IN OUR ELECTRONIC MEDICAL RECORD:     Results for orders placed or performed in visit on 05/04/19   TSH   Result Value Ref Range    TSH 0.035 (L) 0.400 - 4.000 uIU/mL   T4, free   Result Value Ref Range    Free T4 1.34 0.71 - 1.51 ng/dL         "

## 2019-07-09 ENCOUNTER — HOSPITAL ENCOUNTER (OUTPATIENT)
Dept: RADIOLOGY | Facility: HOSPITAL | Age: 54
Discharge: HOME | End: 2019-07-09
Attending: FAMILY MEDICINE
Payer: OTHER GOVERNMENT

## 2019-07-09 DIAGNOSIS — Z12.39 BREAST CANCER SCREENING: ICD-10-CM

## 2019-07-09 PROCEDURE — 77067 SCR MAMMO BI INCL CAD: CPT | Mod: 26,,, | Performed by: RADIOLOGY

## 2019-07-09 PROCEDURE — 77067 MAMMO DIGITAL SCREENING BILAT WITH TOMOSYNTHESIS_CAD: ICD-10-PCS | Mod: 26,,, | Performed by: RADIOLOGY

## 2019-07-09 PROCEDURE — 77067 SCR MAMMO BI INCL CAD: CPT | Mod: TC,PO

## 2019-07-09 PROCEDURE — 77063 BREAST TOMOSYNTHESIS BI: CPT | Mod: 26,,, | Performed by: RADIOLOGY

## 2019-07-09 PROCEDURE — 77063 MAMMO DIGITAL SCREENING BILAT WITH TOMOSYNTHESIS_CAD: ICD-10-PCS | Mod: 26,,, | Performed by: RADIOLOGY

## 2019-10-14 ENCOUNTER — LAB VISIT (OUTPATIENT)
Dept: LAB | Facility: HOSPITAL | Age: 54
End: 2019-10-14
Attending: INTERNAL MEDICINE
Payer: OTHER GOVERNMENT

## 2019-10-14 DIAGNOSIS — E89.0 POSTSURGICAL HYPOTHYROIDISM: Primary | ICD-10-CM

## 2019-10-14 LAB — TSH SERPL DL<=0.005 MIU/L-ACNC: 0.94 UIU/ML (ref 0.4–4)

## 2019-10-14 PROCEDURE — 36415 COLL VENOUS BLD VENIPUNCTURE: CPT | Mod: PO

## 2019-10-14 PROCEDURE — 84443 ASSAY THYROID STIM HORMONE: CPT

## 2020-06-03 ENCOUNTER — PATIENT MESSAGE (OUTPATIENT)
Dept: FAMILY MEDICINE | Facility: CLINIC | Age: 55
End: 2020-06-03

## 2020-07-01 ENCOUNTER — OFFICE VISIT (OUTPATIENT)
Dept: FAMILY MEDICINE | Facility: CLINIC | Age: 55
End: 2020-07-01
Payer: OTHER GOVERNMENT

## 2020-07-01 VITALS
BODY MASS INDEX: 32.24 KG/M2 | HEART RATE: 74 BPM | SYSTOLIC BLOOD PRESSURE: 100 MMHG | RESPIRATION RATE: 18 BRPM | HEIGHT: 68 IN | TEMPERATURE: 98 F | WEIGHT: 212.75 LBS | DIASTOLIC BLOOD PRESSURE: 72 MMHG

## 2020-07-01 DIAGNOSIS — Z12.39 BREAST CANCER SCREENING: ICD-10-CM

## 2020-07-01 DIAGNOSIS — Z00.00 PREVENTATIVE HEALTH CARE: Primary | ICD-10-CM

## 2020-07-01 PROCEDURE — 99396 PR PREVENTIVE VISIT,EST,40-64: ICD-10-PCS | Mod: S$PBB,,, | Performed by: FAMILY MEDICINE

## 2020-07-01 PROCEDURE — 99999 PR PBB SHADOW E&M-EST. PATIENT-LVL IV: CPT | Mod: PBBFAC,,, | Performed by: FAMILY MEDICINE

## 2020-07-01 PROCEDURE — 99396 PREV VISIT EST AGE 40-64: CPT | Mod: S$PBB,,, | Performed by: FAMILY MEDICINE

## 2020-07-01 PROCEDURE — 99214 OFFICE O/P EST MOD 30 MIN: CPT | Mod: PBBFAC,PN | Performed by: FAMILY MEDICINE

## 2020-07-01 PROCEDURE — 99999 PR PBB SHADOW E&M-EST. PATIENT-LVL IV: ICD-10-PCS | Mod: PBBFAC,,, | Performed by: FAMILY MEDICINE

## 2020-07-01 NOTE — PROGRESS NOTES
THIS DOCUMENT WAS MADE IN PART WITH VOICE RECOGNITION SOFTWARE.  OCCASIONALLY THIS SOFTWARE WILL MISINTERPRET WORDS OR PHRASES.      Annamarie Musa  1965    Annamarie was seen today for annual exam.    Diagnoses and all orders for this visit:    Preventative health care  -     Hemoglobin A1C; Future  -     Comprehensive metabolic panel; Future  -     Lipid Panel; Future  -     TSH; Future  -     T3, free; Future  -     T4, free; Future  -     CBC auto differential; Future    Breast cancer screening  -     Mammo Digital Screening Bilateral With CAD; Future     Age-appropriate preventive healthcare.  We did discuss her thyroid briefly as well.  She does see Endocrinology but I will get her labs up-to-date.      Subjective     Chief Complaint   Patient presents with    Annual Exam       HPI:    Annual wellness.  She has generally doing well but has noticed difficulty with weight gain.  Also some fatigue.  She has a history of Graves disease, now on thyroid replacement, appears levels have been normal but still not feeling optimized.    Active Ambulatory Problems     Diagnosis Date Noted    Colon cancer screening 07/11/2018    Graves disease      Resolved Ambulatory Problems     Diagnosis Date Noted    No Resolved Ambulatory Problems     No Additional Past Medical History       Current Outpatient Medications on File Prior to Visit   Medication Sig Dispense Refill    levothyroxine (SYNTHROID) 125 MCG tablet Take 125 mcg by mouth.       No current facility-administered medications on file prior to visit.        Review of patient's allergies indicates:  No Known Allergies    Family History   Problem Relation Age of Onset    Rheum arthritis Mother     Glaucoma Mother     Hypertension Maternal Grandmother        Social History     Tobacco Use    Smoking status: Never Smoker    Smokeless tobacco: Never Used   Substance Use Topics    Alcohol use: No    Drug use: No         Review of Systems   Constitutional:  Positive for fatigue and unexpected weight change. Negative for activity change.   Respiratory: Negative.  Negative for shortness of breath.    Cardiovascular: Negative.  Negative for chest pain.   Gastrointestinal: Negative.    Musculoskeletal: Negative.    Neurological: Negative.        Objective     Physical Exam  Vitals signs reviewed.   Constitutional:       General: She is not in acute distress.     Appearance: She is well-developed. She is not toxic-appearing.   HENT:      Head: Normocephalic and atraumatic.      Right Ear: Tympanic membrane, ear canal and external ear normal.      Left Ear: Tympanic membrane, ear canal and external ear normal.      Nose: Nose normal.      Mouth/Throat:      Pharynx: No oropharyngeal exudate.   Eyes:      General: No scleral icterus.     Conjunctiva/sclera: Conjunctivae normal.      Pupils: Pupils are equal, round, and reactive to light.   Neck:      Musculoskeletal: Normal range of motion and neck supple.      Thyroid: No thyromegaly.      Vascular: No JVD.      Trachea: No tracheal deviation.   Cardiovascular:      Rate and Rhythm: Normal rate and regular rhythm.      Chest Wall: PMI is not displaced.      Heart sounds: Normal heart sounds. No murmur. No friction rub. No gallop.    Pulmonary:      Effort: No respiratory distress.      Breath sounds: Normal breath sounds. No wheezing or rales.   Abdominal:      General: Bowel sounds are normal. There is no distension.      Palpations: Abdomen is soft. There is no mass.      Tenderness: There is no abdominal tenderness. There is no guarding or rebound.   Lymphadenopathy:      Cervical: No cervical adenopathy.   Skin:     General: Skin is dry.      Coloration: Skin is not pale.      Findings: No rash.   Neurological:      Mental Status: She is alert and oriented to person, place, and time.      Cranial Nerves: No cranial nerve deficit.      Motor: No abnormal muscle tone.      Deep Tendon Reflexes: Reflexes normal.  "  Psychiatric:         Behavior: Behavior normal.         Vitals:    07/01/20 1613   BP: 100/72   BP Location: Left arm   Patient Position: Sitting   BP Method: Medium (Manual)   Pulse: 74   Resp: 18   Temp: 98.1 °F (36.7 °C)   TempSrc: Temporal   Weight: 96.5 kg (212 lb 11.9 oz)   Height: 5' 8" (1.727 m)       MOST RECENT LABS IN OUR ELECTRONIC MEDICAL RECORD:     Results for orders placed or performed in visit on 10/14/19   TSH   Result Value Ref Range    TSH 0.943 0.400 - 4.000 uIU/mL         Age specific and appropriate preventative healthcare discussed/ health maintenance reviewed. Discussed healthy diet and regular exercise. Discussed age-appropriate preventative screening tests and recommendations. Discussed recommended follow-up based on age and conditions.    "

## 2020-07-08 ENCOUNTER — LAB VISIT (OUTPATIENT)
Dept: LAB | Facility: HOSPITAL | Age: 55
End: 2020-07-08
Attending: FAMILY MEDICINE
Payer: OTHER GOVERNMENT

## 2020-07-08 DIAGNOSIS — Z00.00 PREVENTATIVE HEALTH CARE: ICD-10-CM

## 2020-07-08 LAB
ALBUMIN SERPL BCP-MCNC: 4.1 G/DL (ref 3.5–5.2)
ALP SERPL-CCNC: 95 U/L (ref 55–135)
ALT SERPL W/O P-5'-P-CCNC: 13 U/L (ref 10–44)
ANION GAP SERPL CALC-SCNC: 10 MMOL/L (ref 8–16)
AST SERPL-CCNC: 16 U/L (ref 10–40)
BASOPHILS # BLD AUTO: 0.02 K/UL (ref 0–0.2)
BASOPHILS NFR BLD: 0.4 % (ref 0–1.9)
BILIRUB SERPL-MCNC: 0.3 MG/DL (ref 0.1–1)
BUN SERPL-MCNC: 14 MG/DL (ref 6–20)
CALCIUM SERPL-MCNC: 9.6 MG/DL (ref 8.7–10.5)
CHLORIDE SERPL-SCNC: 106 MMOL/L (ref 95–110)
CHOLEST SERPL-MCNC: 260 MG/DL (ref 120–199)
CHOLEST/HDLC SERPL: 4.5 {RATIO} (ref 2–5)
CO2 SERPL-SCNC: 23 MMOL/L (ref 23–29)
CREAT SERPL-MCNC: 1.3 MG/DL (ref 0.5–1.4)
DIFFERENTIAL METHOD: ABNORMAL
EOSINOPHIL # BLD AUTO: 0.1 K/UL (ref 0–0.5)
EOSINOPHIL NFR BLD: 2.8 % (ref 0–8)
ERYTHROCYTE [DISTWIDTH] IN BLOOD BY AUTOMATED COUNT: 15.5 % (ref 11.5–14.5)
EST. GFR  (AFRICAN AMERICAN): 54.1 ML/MIN/1.73 M^2
EST. GFR  (NON AFRICAN AMERICAN): 47 ML/MIN/1.73 M^2
ESTIMATED AVG GLUCOSE: 123 MG/DL (ref 68–131)
GLUCOSE SERPL-MCNC: 91 MG/DL (ref 70–110)
HBA1C MFR BLD HPLC: 5.9 % (ref 4–5.6)
HCT VFR BLD AUTO: 36.1 % (ref 37–48.5)
HDLC SERPL-MCNC: 58 MG/DL (ref 40–75)
HDLC SERPL: 22.3 % (ref 20–50)
HGB BLD-MCNC: 11.5 G/DL (ref 12–16)
IMM GRANULOCYTES # BLD AUTO: 0.01 K/UL (ref 0–0.04)
IMM GRANULOCYTES NFR BLD AUTO: 0.2 % (ref 0–0.5)
LDLC SERPL CALC-MCNC: 180.6 MG/DL (ref 63–159)
LYMPHOCYTES # BLD AUTO: 2.2 K/UL (ref 1–4.8)
LYMPHOCYTES NFR BLD: 45.3 % (ref 18–48)
MCH RBC QN AUTO: 29.5 PG (ref 27–31)
MCHC RBC AUTO-ENTMCNC: 31.9 G/DL (ref 32–36)
MCV RBC AUTO: 93 FL (ref 82–98)
MONOCYTES # BLD AUTO: 0.3 K/UL (ref 0.3–1)
MONOCYTES NFR BLD: 6.1 % (ref 4–15)
NEUTROPHILS # BLD AUTO: 2.2 K/UL (ref 1.8–7.7)
NEUTROPHILS NFR BLD: 45.2 % (ref 38–73)
NONHDLC SERPL-MCNC: 202 MG/DL
NRBC BLD-RTO: 0 /100 WBC
PLATELET # BLD AUTO: 281 K/UL (ref 150–350)
PMV BLD AUTO: 10.7 FL (ref 9.2–12.9)
POTASSIUM SERPL-SCNC: 4 MMOL/L (ref 3.5–5.1)
PROT SERPL-MCNC: 7.9 G/DL (ref 6–8.4)
RBC # BLD AUTO: 3.9 M/UL (ref 4–5.4)
SODIUM SERPL-SCNC: 139 MMOL/L (ref 136–145)
T3FREE SERPL-MCNC: 3.1 PG/ML (ref 2.3–4.2)
T4 FREE SERPL-MCNC: 1.15 NG/DL (ref 0.71–1.51)
TRIGL SERPL-MCNC: 107 MG/DL (ref 30–150)
TSH SERPL DL<=0.005 MIU/L-ACNC: 33.71 UIU/ML (ref 0.4–4)
WBC # BLD AUTO: 4.92 K/UL (ref 3.9–12.7)

## 2020-07-08 PROCEDURE — 84481 FREE ASSAY (FT-3): CPT

## 2020-07-08 PROCEDURE — 84439 ASSAY OF FREE THYROXINE: CPT

## 2020-07-08 PROCEDURE — 85025 COMPLETE CBC W/AUTO DIFF WBC: CPT

## 2020-07-08 PROCEDURE — 80053 COMPREHEN METABOLIC PANEL: CPT

## 2020-07-08 PROCEDURE — 80061 LIPID PANEL: CPT

## 2020-07-08 PROCEDURE — 83036 HEMOGLOBIN GLYCOSYLATED A1C: CPT

## 2020-07-08 PROCEDURE — 36415 COLL VENOUS BLD VENIPUNCTURE: CPT | Mod: PN

## 2020-07-08 PROCEDURE — 84443 ASSAY THYROID STIM HORMONE: CPT

## 2020-07-10 ENCOUNTER — TELEPHONE (OUTPATIENT)
Dept: FAMILY MEDICINE | Facility: CLINIC | Age: 55
End: 2020-07-10

## 2020-07-10 DIAGNOSIS — E89.0 POSTABLATIVE HYPOTHYROIDISM: Primary | ICD-10-CM

## 2020-07-10 DIAGNOSIS — D64.9 ANEMIA, UNSPECIFIED TYPE: ICD-10-CM

## 2020-07-10 RX ORDER — LEVOTHYROXINE SODIUM 150 UG/1
150 TABLET ORAL
Qty: 90 TABLET | Refills: 1 | Status: SHIPPED | OUTPATIENT
Start: 2020-07-10 | End: 2021-01-07 | Stop reason: SDUPTHER

## 2020-07-10 NOTE — TELEPHONE ENCOUNTER
Please call with lab results.    TSH is very high.  If she has been on her thyroid medicine regularly and not missing any doses then we need to increase the dosage of her medication.  I believe she is near or at the end of her prescription so please check with her ASAP before the end of the day so I can send in a new prescription.    Also she does have an anemia.  See if she has been having any bleeding either gastrointestinal, vaginal, or good ending blood regularly and let me know.  We will need to follow-up with this and check iron levels and stool studies, orders entered.    Also, cholesterol is little high.  This can be affected some by the thyroid but still need to continue to work on diet and exercise.    Also average glucose is mildly elevated.    I will put in orders to repeat labs once I hear the response.

## 2020-07-10 NOTE — TELEPHONE ENCOUNTER
Reviewed with pt that is taking her levothyroxine consistently on an empty stomach every morning, she is ok with dose increase.    She has not noticed any visible blood in urine or stool     She is in agreement to working on diet and exercise for blood sugar and cholesterol control

## 2020-07-10 NOTE — TELEPHONE ENCOUNTER
I sent in a new prescription for the levothyroxine 150 mcg daily.  Please schedule labs in about two months and follow-up afterwards.

## 2021-01-07 ENCOUNTER — TELEPHONE (OUTPATIENT)
Dept: FAMILY MEDICINE | Facility: CLINIC | Age: 56
End: 2021-01-07

## 2021-01-07 DIAGNOSIS — E89.0 POSTABLATIVE HYPOTHYROIDISM: ICD-10-CM

## 2021-01-07 RX ORDER — LEVOTHYROXINE SODIUM 150 UG/1
150 TABLET ORAL
Qty: 90 TABLET | Refills: 1 | Status: SHIPPED | OUTPATIENT
Start: 2021-01-07 | End: 2021-02-17

## 2021-01-08 ENCOUNTER — LAB VISIT (OUTPATIENT)
Dept: LAB | Facility: HOSPITAL | Age: 56
End: 2021-01-08
Attending: FAMILY MEDICINE
Payer: OTHER GOVERNMENT

## 2021-01-08 DIAGNOSIS — D64.9 ANEMIA, UNSPECIFIED TYPE: ICD-10-CM

## 2021-01-08 DIAGNOSIS — E89.0 POSTABLATIVE HYPOTHYROIDISM: ICD-10-CM

## 2021-01-08 LAB
BASOPHILS # BLD AUTO: 0.04 K/UL (ref 0–0.2)
BASOPHILS NFR BLD: 0.9 % (ref 0–1.9)
DIFFERENTIAL METHOD: ABNORMAL
EOSINOPHIL # BLD AUTO: 0.1 K/UL (ref 0–0.5)
EOSINOPHIL NFR BLD: 3.3 % (ref 0–8)
ERYTHROCYTE [DISTWIDTH] IN BLOOD BY AUTOMATED COUNT: 14.6 % (ref 11.5–14.5)
FERRITIN SERPL-MCNC: 80 NG/ML (ref 20–300)
FOLATE SERPL-MCNC: >40 NG/ML (ref 4–24)
HCT VFR BLD AUTO: 37.8 % (ref 37–48.5)
HGB BLD-MCNC: 11.7 G/DL (ref 12–16)
IMM GRANULOCYTES # BLD AUTO: 0 K/UL (ref 0–0.04)
IMM GRANULOCYTES NFR BLD AUTO: 0 % (ref 0–0.5)
IRON SERPL-MCNC: 67 UG/DL (ref 30–160)
LYMPHOCYTES # BLD AUTO: 2.1 K/UL (ref 1–4.8)
LYMPHOCYTES NFR BLD: 50.1 % (ref 18–48)
MCH RBC QN AUTO: 28.2 PG (ref 27–31)
MCHC RBC AUTO-ENTMCNC: 31 G/DL (ref 32–36)
MCV RBC AUTO: 91 FL (ref 82–98)
MONOCYTES # BLD AUTO: 0.3 K/UL (ref 0.3–1)
MONOCYTES NFR BLD: 7.6 % (ref 4–15)
NEUTROPHILS # BLD AUTO: 1.6 K/UL (ref 1.8–7.7)
NEUTROPHILS NFR BLD: 38.1 % (ref 38–73)
NRBC BLD-RTO: 0 /100 WBC
PLATELET # BLD AUTO: 296 K/UL (ref 150–350)
PMV BLD AUTO: 10.6 FL (ref 9.2–12.9)
RBC # BLD AUTO: 4.15 M/UL (ref 4–5.4)
SATURATED IRON: 19 % (ref 20–50)
T3FREE SERPL-MCNC: 3.4 PG/ML (ref 2.3–4.2)
T4 FREE SERPL-MCNC: 1.46 NG/DL (ref 0.71–1.51)
TOTAL IRON BINDING CAPACITY: 345 UG/DL (ref 250–450)
TRANSFERRIN SERPL-MCNC: 233 MG/DL (ref 200–375)
TSH SERPL DL<=0.005 MIU/L-ACNC: 0.01 UIU/ML (ref 0.4–4)
VIT B12 SERPL-MCNC: 740 PG/ML (ref 210–950)
WBC # BLD AUTO: 4.23 K/UL (ref 3.9–12.7)

## 2021-01-08 PROCEDURE — 82607 VITAMIN B-12: CPT

## 2021-01-08 PROCEDURE — 36415 COLL VENOUS BLD VENIPUNCTURE: CPT | Mod: PN

## 2021-01-08 PROCEDURE — 85025 COMPLETE CBC W/AUTO DIFF WBC: CPT

## 2021-01-08 PROCEDURE — 82746 ASSAY OF FOLIC ACID SERUM: CPT

## 2021-01-08 PROCEDURE — 84481 FREE ASSAY (FT-3): CPT

## 2021-01-08 PROCEDURE — 82728 ASSAY OF FERRITIN: CPT

## 2021-01-08 PROCEDURE — 84439 ASSAY OF FREE THYROXINE: CPT

## 2021-01-08 PROCEDURE — 83540 ASSAY OF IRON: CPT

## 2021-01-08 PROCEDURE — 84443 ASSAY THYROID STIM HORMONE: CPT

## 2021-02-01 ENCOUNTER — TELEPHONE (OUTPATIENT)
Dept: PRIMARY CARE CLINIC | Facility: CLINIC | Age: 56
End: 2021-02-01

## 2021-02-01 DIAGNOSIS — E03.9 HYPOTHYROIDISM, UNSPECIFIED TYPE: Primary | ICD-10-CM

## 2021-02-15 ENCOUNTER — LAB VISIT (OUTPATIENT)
Dept: LAB | Facility: HOSPITAL | Age: 56
End: 2021-02-15
Attending: FAMILY MEDICINE
Payer: OTHER GOVERNMENT

## 2021-02-15 DIAGNOSIS — E03.9 HYPOTHYROIDISM, UNSPECIFIED TYPE: ICD-10-CM

## 2021-02-15 LAB
T3FREE SERPL-MCNC: 3.5 PG/ML (ref 2.3–4.2)
T4 FREE SERPL-MCNC: 1.68 NG/DL (ref 0.71–1.51)
TSH SERPL DL<=0.005 MIU/L-ACNC: <0.01 UIU/ML (ref 0.4–4)

## 2021-02-15 PROCEDURE — 84439 ASSAY OF FREE THYROXINE: CPT

## 2021-02-15 PROCEDURE — 84481 FREE ASSAY (FT-3): CPT

## 2021-02-15 PROCEDURE — 36415 COLL VENOUS BLD VENIPUNCTURE: CPT | Mod: PO

## 2021-02-15 PROCEDURE — 84443 ASSAY THYROID STIM HORMONE: CPT

## 2021-02-17 ENCOUNTER — TELEPHONE (OUTPATIENT)
Dept: PRIMARY CARE CLINIC | Facility: CLINIC | Age: 56
End: 2021-02-17

## 2021-02-17 DIAGNOSIS — E89.0 POSTABLATIVE HYPOTHYROIDISM: ICD-10-CM

## 2021-02-17 RX ORDER — LEVOTHYROXINE SODIUM 100 UG/1
150 TABLET ORAL
Qty: 90 TABLET | Refills: 0 | Status: SHIPPED | OUTPATIENT
Start: 2021-02-17 | End: 2021-03-31 | Stop reason: SDUPTHER

## 2021-03-31 ENCOUNTER — PATIENT MESSAGE (OUTPATIENT)
Dept: FAMILY MEDICINE | Facility: CLINIC | Age: 56
End: 2021-03-31

## 2021-03-31 DIAGNOSIS — E89.0 POSTABLATIVE HYPOTHYROIDISM: ICD-10-CM

## 2021-03-31 RX ORDER — LEVOTHYROXINE SODIUM 100 UG/1
100 TABLET ORAL
Qty: 90 TABLET | Refills: 0 | Status: SHIPPED | OUTPATIENT
Start: 2021-03-31 | End: 2021-04-21 | Stop reason: SDUPTHER

## 2021-04-02 ENCOUNTER — PATIENT MESSAGE (OUTPATIENT)
Dept: FAMILY MEDICINE | Facility: CLINIC | Age: 56
End: 2021-04-02

## 2021-04-06 ENCOUNTER — PATIENT MESSAGE (OUTPATIENT)
Dept: ADMINISTRATIVE | Facility: HOSPITAL | Age: 56
End: 2021-04-06

## 2021-04-10 ENCOUNTER — IMMUNIZATION (OUTPATIENT)
Dept: FAMILY MEDICINE | Facility: CLINIC | Age: 56
End: 2021-04-10
Payer: OTHER GOVERNMENT

## 2021-04-10 DIAGNOSIS — Z23 NEED FOR VACCINATION: Primary | ICD-10-CM

## 2021-04-10 PROCEDURE — 91300 COVID-19, MRNA, LNP-S, PF, 30 MCG/0.3 ML DOSE VACCINE: CPT | Mod: S$GLB,,, | Performed by: FAMILY MEDICINE

## 2021-04-10 PROCEDURE — 0001A COVID-19, MRNA, LNP-S, PF, 30 MCG/0.3 ML DOSE VACCINE: CPT | Mod: CV19,S$GLB,, | Performed by: FAMILY MEDICINE

## 2021-04-10 PROCEDURE — 0001A COVID-19, MRNA, LNP-S, PF, 30 MCG/0.3 ML DOSE VACCINE: ICD-10-PCS | Mod: CV19,S$GLB,, | Performed by: FAMILY MEDICINE

## 2021-04-10 PROCEDURE — 91300 COVID-19, MRNA, LNP-S, PF, 30 MCG/0.3 ML DOSE VACCINE: ICD-10-PCS | Mod: S$GLB,,, | Performed by: FAMILY MEDICINE

## 2021-04-19 ENCOUNTER — LAB VISIT (OUTPATIENT)
Dept: LAB | Facility: HOSPITAL | Age: 56
End: 2021-04-19
Attending: FAMILY MEDICINE
Payer: OTHER GOVERNMENT

## 2021-04-19 DIAGNOSIS — E89.0 POSTABLATIVE HYPOTHYROIDISM: ICD-10-CM

## 2021-04-19 PROCEDURE — 36415 COLL VENOUS BLD VENIPUNCTURE: CPT | Mod: PO | Performed by: FAMILY MEDICINE

## 2021-04-19 PROCEDURE — 84443 ASSAY THYROID STIM HORMONE: CPT | Performed by: FAMILY MEDICINE

## 2021-04-19 PROCEDURE — 84439 ASSAY OF FREE THYROXINE: CPT | Performed by: FAMILY MEDICINE

## 2021-04-20 LAB
T4 FREE SERPL-MCNC: 1.52 NG/DL (ref 0.71–1.51)
TSH SERPL DL<=0.005 MIU/L-ACNC: 0.05 UIU/ML (ref 0.4–4)

## 2021-04-21 ENCOUNTER — TELEPHONE (OUTPATIENT)
Dept: PRIMARY CARE CLINIC | Facility: CLINIC | Age: 56
End: 2021-04-21

## 2021-04-21 DIAGNOSIS — E89.0 POSTABLATIVE HYPOTHYROIDISM: ICD-10-CM

## 2021-04-21 RX ORDER — LEVOTHYROXINE SODIUM 100 UG/1
100 TABLET ORAL
Qty: 90 TABLET | Refills: 0 | Status: SHIPPED | OUTPATIENT
Start: 2021-04-21

## 2021-05-01 ENCOUNTER — IMMUNIZATION (OUTPATIENT)
Dept: FAMILY MEDICINE | Facility: CLINIC | Age: 56
End: 2021-05-01
Payer: OTHER GOVERNMENT

## 2021-05-01 DIAGNOSIS — Z23 NEED FOR VACCINATION: Primary | ICD-10-CM

## 2021-05-01 PROCEDURE — 0002A COVID-19, MRNA, LNP-S, PF, 30 MCG/0.3 ML DOSE VACCINE: ICD-10-PCS | Mod: CV19,S$GLB,, | Performed by: INTERNAL MEDICINE

## 2021-05-01 PROCEDURE — 91300 COVID-19, MRNA, LNP-S, PF, 30 MCG/0.3 ML DOSE VACCINE: ICD-10-PCS | Mod: S$GLB,,, | Performed by: INTERNAL MEDICINE

## 2021-05-01 PROCEDURE — 91300 COVID-19, MRNA, LNP-S, PF, 30 MCG/0.3 ML DOSE VACCINE: CPT | Mod: S$GLB,,, | Performed by: INTERNAL MEDICINE

## 2021-05-01 PROCEDURE — 0002A COVID-19, MRNA, LNP-S, PF, 30 MCG/0.3 ML DOSE VACCINE: CPT | Mod: CV19,S$GLB,, | Performed by: INTERNAL MEDICINE

## 2022-07-08 ENCOUNTER — APPOINTMENT (RX ONLY)
Dept: URBAN - METROPOLITAN AREA CLINIC 341 | Facility: CLINIC | Age: 57
Setting detail: DERMATOLOGY
End: 2022-07-08

## 2022-07-08 PROBLEM — D23.5 OTHER BENIGN NEOPLASM OF SKIN OF TRUNK: Status: ACTIVE | Noted: 2022-07-08

## 2022-07-08 PROCEDURE — ? OBSERVATION

## 2022-07-08 PROCEDURE — ? COUNSELING

## 2022-07-08 PROCEDURE — 99202 OFFICE O/P NEW SF 15 MIN: CPT

## 2022-12-09 ENCOUNTER — APPOINTMENT (RX ONLY)
Dept: URBAN - METROPOLITAN AREA CLINIC 341 | Facility: CLINIC | Age: 57
Setting detail: DERMATOLOGY
End: 2022-12-09

## 2022-12-09 PROBLEM — D23.5 OTHER BENIGN NEOPLASM OF SKIN OF TRUNK: Status: ACTIVE | Noted: 2022-12-09

## 2022-12-09 PROCEDURE — ? OBSERVATION

## 2022-12-09 PROCEDURE — 99212 OFFICE O/P EST SF 10 MIN: CPT

## 2022-12-09 PROCEDURE — ? COUNSELING

## 2024-06-14 ENCOUNTER — OFFICE VISIT (OUTPATIENT)
Dept: URGENT CARE | Facility: CLINIC | Age: 59
End: 2024-06-14
Payer: OTHER GOVERNMENT

## 2024-06-14 VITALS
BODY MASS INDEX: 32.13 KG/M2 | DIASTOLIC BLOOD PRESSURE: 77 MMHG | TEMPERATURE: 99 F | OXYGEN SATURATION: 98 % | HEIGHT: 68 IN | RESPIRATION RATE: 20 BRPM | HEART RATE: 84 BPM | SYSTOLIC BLOOD PRESSURE: 120 MMHG | WEIGHT: 212 LBS

## 2024-06-14 DIAGNOSIS — R19.7 NAUSEA VOMITING AND DIARRHEA: ICD-10-CM

## 2024-06-14 DIAGNOSIS — K52.9 GASTROENTERITIS: Primary | ICD-10-CM

## 2024-06-14 DIAGNOSIS — R11.2 NAUSEA VOMITING AND DIARRHEA: ICD-10-CM

## 2024-06-14 PROCEDURE — 99204 OFFICE O/P NEW MOD 45 MIN: CPT | Mod: S$GLB,,, | Performed by: NURSE PRACTITIONER

## 2024-06-14 PROCEDURE — S0119 ONDANSETRON 4 MG: HCPCS | Mod: S$GLB,,, | Performed by: EMERGENCY MEDICINE

## 2024-06-14 RX ORDER — ONDANSETRON 4 MG/1
4 TABLET, ORALLY DISINTEGRATING ORAL EVERY 12 HOURS PRN
Qty: 6 TABLET | Refills: 0 | Status: SHIPPED | OUTPATIENT
Start: 2024-06-14 | End: 2024-06-17

## 2024-06-14 RX ORDER — ONDANSETRON 8 MG/1
8 TABLET, ORALLY DISINTEGRATING ORAL
Status: COMPLETED | OUTPATIENT
Start: 2024-06-14 | End: 2024-06-14

## 2024-06-14 RX ORDER — LOPERAMIDE HYDROCHLORIDE 2 MG/1
2 CAPSULE ORAL 4 TIMES DAILY PRN
Qty: 12 CAPSULE | Refills: 0 | Status: SHIPPED | OUTPATIENT
Start: 2024-06-14 | End: 2024-06-17

## 2024-06-14 RX ADMIN — ONDANSETRON 8 MG: 8 TABLET, ORALLY DISINTEGRATING ORAL at 03:06

## 2024-06-14 NOTE — PROGRESS NOTES
"Subjective:      Patient ID: Annamarie Musa is a 59 y.o. female.    Vitals:  height is 5' 8" (1.727 m) and weight is 96.2 kg (212 lb). Her temperature is 98.6 °F (37 °C). Her blood pressure is 120/77 and her pulse is 84. Her respiration is 20 and oxygen saturation is 98%.     Chief Complaint: Abdominal Pain    59-year-old female seen today for nausea vomiting and diarrhea that began yesterday.  She has generalized abdominal pain.  She denies any urinary symptoms. Symptoms began yesterday morning in the early hours of the morning.  Last meal prior to that was two egg rolls at approximately 6:00 p.m. the previous evening.  She reports numerous episodes of diarrhea.  Two episodes of vomiting yesterday and 2 today so far.  There has been no fever.    Abdominal Pain  This is a new problem. The current episode started yesterday. The onset quality is sudden. The problem occurs constantly. The problem has been unchanged. The pain is located in the generalized abdominal region. The pain is severe. The quality of the pain is aching. Associated symptoms include diarrhea, nausea and vomiting. Pertinent negatives include no dysuria, fever or frequency. She has tried antacids for the symptoms. The treatment provided no relief.       Constitution: Positive for appetite change. Negative for chills, sweating and fever.   HENT:  Negative for sore throat.    Cardiovascular:  Negative for chest pain, palpitations and sob on exertion.   Respiratory:  Negative for shortness of breath.    Gastrointestinal:  Positive for abdominal pain, nausea, vomiting and diarrhea.   Genitourinary:  Negative for dysuria, frequency and urgency.   Skin:  Negative for rash.   Neurological:  Negative for dizziness, light-headedness, passing out, disorientation and altered mental status.   Psychiatric/Behavioral:  Negative for altered mental status, disorientation and confusion.       Objective:     Physical Exam   Constitutional: She is oriented to person, " place, and time. She appears well-developed. She is cooperative.  Non-toxic appearance. She does not appear ill. No distress.   HENT:   Head: Normocephalic and atraumatic.   Ears:   Right Ear: External ear normal.   Left Ear: External ear normal.   Nose: Nose normal.   Mouth/Throat: Oropharynx is clear and moist and mucous membranes are normal. Mucous membranes are moist.   Eyes: Conjunctivae and lids are normal. No scleral icterus.   Neck: Trachea normal and phonation normal. Neck supple.   Cardiovascular: Normal rate, regular rhythm, normal heart sounds and normal pulses.   Pulmonary/Chest: Effort normal and breath sounds normal. No stridor. No respiratory distress.   Abdominal: Normal appearance. She exhibits no distension and no mass. Soft. Bowel sounds are increased. protuberant There is no splenomegaly or hepatomegaly. There is abdominal tenderness in the suprapubic area and left lower quadrant. There is no rebound, no guarding, no tenderness at McBurney's point, negative Monteiro's sign, no left CVA tenderness, negative Rovsing's sign, negative psoas sign, no right CVA tenderness and negative obturator sign. No hernia.   Musculoskeletal:         General: No deformity.   Neurological: no focal deficit. She is alert and oriented to person, place, and time. She has normal strength and normal reflexes. No sensory deficit.   Skin: Skin is warm, dry, intact and not diaphoretic. Capillary refill takes 2 to 3 seconds.   Psychiatric: Her speech is normal and behavior is normal. Judgment and thought content normal.   Nursing note and vitals reviewed.      Assessment:     1. Gastroenteritis    2. Nausea vomiting and diarrhea        Plan:       Gastroenteritis  -     ondansetron (ZOFRAN-ODT) 4 MG TbDL; Take 1 tablet (4 mg total) by mouth every 12 (twelve) hours as needed (nausea).  Dispense: 6 tablet; Refill: 0  -     loperamide (IMODIUM) 2 mg capsule; Take 1 capsule (2 mg total) by mouth 4 (four) times daily as needed for  Diarrhea (for over 3 loose stools daily).  Dispense: 12 capsule; Refill: 0    Nausea vomiting and diarrhea  -     ondansetron (ZOFRAN-ODT) 4 MG TbDL; Take 1 tablet (4 mg total) by mouth every 12 (twelve) hours as needed (nausea).  Dispense: 6 tablet; Refill: 0  -     loperamide (IMODIUM) 2 mg capsule; Take 1 capsule (2 mg total) by mouth 4 (four) times daily as needed for Diarrhea (for over 3 loose stools daily).  Dispense: 12 capsule; Refill: 0  -     ondansetron disintegrating tablet 8 mg  -     Stool culture; Future; Expected date: 06/14/2024  -     Stool Exam-Ova,Cysts,Parasites; Future; Expected date: 06/14/2024  -     WBC, Stool; Future; Expected date: 06/14/2024  -     Clostridium difficile EIA; Future; Expected date: 06/14/2024      The test results and physical exam findings were discussed with the patient and all questions answered. We discussed symptom monitoring, conservative care methods, medication use, and follow up orders. she verbalized understanding and agreement with the plan of care.

## 2024-06-14 NOTE — PATIENT INSTRUCTIONS
Increase clear fluid intake.  May use Pedialyte to replace lost electrolytes  Slowly increase diet.  Avoid spicy, dairy, and greasy foods.  Start fluid based foods such as soups, mashed potatoes, Jell-O and introduce other foods as tolerated.  May take Zofran as needed for nausea vomiting  May take loperamide as needed for more than 3 loose bowel movements daily  Obtain stool sample and deliver it to a [facility].  Someone will call you with the results once they have been received.  Any changes in your plan of care will then be discussed.  Follow-up with PCP  Go directly to the emergency room for shortness of breath, chest pain, dizziness, fever that does not respond to medication, or other emergent concern  Return to clinic as needed for any concern